# Patient Record
Sex: MALE | Race: WHITE | NOT HISPANIC OR LATINO | Employment: FULL TIME | URBAN - METROPOLITAN AREA
[De-identification: names, ages, dates, MRNs, and addresses within clinical notes are randomized per-mention and may not be internally consistent; named-entity substitution may affect disease eponyms.]

---

## 2024-11-20 ENCOUNTER — OFFICE VISIT (OUTPATIENT)
Dept: UROLOGY | Facility: CLINIC | Age: 64
End: 2024-11-20
Payer: COMMERCIAL

## 2024-11-20 VITALS
DIASTOLIC BLOOD PRESSURE: 81 MMHG | WEIGHT: 165.38 LBS | HEIGHT: 73 IN | HEART RATE: 102 BPM | BODY MASS INDEX: 21.92 KG/M2 | SYSTOLIC BLOOD PRESSURE: 122 MMHG

## 2024-11-20 DIAGNOSIS — N13.8 BPH WITH URINARY OBSTRUCTION: ICD-10-CM

## 2024-11-20 DIAGNOSIS — R35.1 NOCTURIA: ICD-10-CM

## 2024-11-20 DIAGNOSIS — N40.1 BPH WITH URINARY OBSTRUCTION: ICD-10-CM

## 2024-11-20 DIAGNOSIS — N20.0 KIDNEY STONE: Primary | ICD-10-CM

## 2024-11-20 DIAGNOSIS — R97.20 PSA ELEVATION: ICD-10-CM

## 2024-11-20 LAB
BILIRUB UR QL STRIP: POSITIVE
GLUCOSE UR QL STRIP: NEGATIVE
KETONES UR QL STRIP: POSITIVE
LEUKOCYTE ESTERASE UR QL STRIP: NEGATIVE
PH, POC UA: 6.5
POC BLOOD, URINE: NEGATIVE
POC NITRATES, URINE: NEGATIVE
POC RESIDUAL URINE VOLUME: 15 ML (ref 0–100)
PROT UR QL STRIP: POSITIVE
SP GR UR STRIP: 1.02 (ref 1–1.03)
UROBILINOGEN UR STRIP-ACNC: 0.2 (ref 0.3–2.2)

## 2024-11-20 PROCEDURE — 99999 PR PBB SHADOW E&M-NEW PATIENT-LVL IV: CPT | Mod: PBBFAC,,, | Performed by: UROLOGY

## 2024-11-20 RX ORDER — METFORMIN HYDROCHLORIDE 500 MG/1
TABLET, EXTENDED RELEASE ORAL
COMMUNITY
Start: 2024-08-20

## 2024-11-20 RX ORDER — KETOCONAZOLE 20 MG/ML
SHAMPOO, SUSPENSION TOPICAL
COMMUNITY
Start: 2024-01-24

## 2024-11-20 RX ORDER — PANTOPRAZOLE SODIUM 40 MG/1
40 TABLET, DELAYED RELEASE ORAL 2 TIMES DAILY
COMMUNITY

## 2024-11-20 RX ORDER — LIDOCAINE HYDROCHLORIDE 20 MG/ML
SOLUTION OROPHARYNGEAL
COMMUNITY
Start: 2024-06-19

## 2024-11-20 RX ORDER — GABAPENTIN 300 MG/1
600 CAPSULE ORAL 2 TIMES DAILY
COMMUNITY
Start: 2024-11-19

## 2024-11-20 RX ORDER — HYDROCODONE BITARTRATE AND ACETAMINOPHEN 10; 325 MG/1; MG/1
1 TABLET ORAL NIGHTLY PRN
COMMUNITY
Start: 2024-10-17

## 2024-11-20 RX ORDER — TAMSULOSIN HYDROCHLORIDE 0.4 MG/1
0.4 CAPSULE ORAL NIGHTLY
Qty: 90 CAPSULE | Refills: 1 | Status: SHIPPED | OUTPATIENT
Start: 2024-11-20 | End: 2025-11-20

## 2024-11-20 RX ORDER — ATORVASTATIN CALCIUM 20 MG/1
1 TABLET, FILM COATED ORAL DAILY
COMMUNITY
Start: 2024-09-03

## 2024-11-20 RX ORDER — CHLORHEXIDINE GLUCONATE ORAL RINSE 1.2 MG/ML
SOLUTION DENTAL
COMMUNITY
Start: 2024-06-19

## 2024-11-20 RX ORDER — CYCLOBENZAPRINE HCL 10 MG
10 TABLET ORAL NIGHTLY PRN
COMMUNITY
Start: 2024-09-12

## 2024-11-20 RX ORDER — LINACLOTIDE 145 UG/1
145 CAPSULE, GELATIN COATED ORAL
COMMUNITY

## 2024-11-20 RX ORDER — TIRZEPATIDE 2.5 MG/.5ML
2.5 INJECTION, SOLUTION SUBCUTANEOUS
COMMUNITY
Start: 2024-09-03

## 2024-11-20 RX ORDER — CITALOPRAM 20 MG/1
1 TABLET, FILM COATED ORAL DAILY
COMMUNITY
Start: 2024-09-03

## 2024-11-20 NOTE — PROGRESS NOTES
Subjective:       Patient ID: Peter Hdez is a 64 y.o. male.    Chief Complaint: Establish Care      History of Present Illness:     Mr Hdez has a history of kidneys stones.  He underwent a CT abdomen/pelvis without IV contrast on 9-17-24 that showed bilateral renal stones measuring up to 5 mm in the right kidney and 3 mm in the left kidney.  No ureteral stone or hydronephrosis.  Prostate gland is enlarged with central calcifications.     He has BPH with LUTS.  He had a few alfuzosin at home and he took it for 3 days with improvement in his flow.  Urinary flow has been slow.  Nocturia X 3.  No gross hematuria.  No flank pain.         Past Medical History:   Diagnosis Date    Diabetes mellitus     Kidney stone      Family History   Problem Relation Name Age of Onset    No Known Problems Father      Other (heart problems) Mother       Social History     Socioeconomic History    Marital status:    Tobacco Use    Smoking status: Never     Passive exposure: Never    Smokeless tobacco: Never   Substance and Sexual Activity    Alcohol use: Not Currently    Drug use: Never    Sexual activity: Not Currently     Social Drivers of Health     Financial Resource Strain: Low Risk  (11/2/2021)    Received from [facility] and Its Subsidiaries and Affiliates    Overall Financial Resource Strain (CARDIA)     Difficulty of Paying Living Expenses: Not very hard   Food Insecurity: No Food Insecurity (11/2/2021)    Received from [facility] and Its Subsidiaries and Affiliates    Hunger Vital Sign     Worried About Running Out of Food in the Last Year: Never true     Ran Out of Food in the Last Year: Never true   Transportation Needs: No Transportation Needs (11/2/2021)    Received from [facility] and Its Subsidiaries and Affiliates    PRAPARE - Transportation     Lack of Transportation (Medical): No      Lack of Transportation (Non-Medical): No   Physical Activity: Inactive (11/2/2021)    Received from [facility] and Its Subsidiaries and Affiliates    Exercise Vital Sign     Days of Exercise per Week: 0 days     Minutes of Exercise per Session: 0 min   Stress: Stress Concern Present (11/2/2021)    Received from [facility] and Its Subsidiaries and Affiliates    [employer] [place] [employer] - Occupational Stress Questionnaire     Feeling of Stress : Very much   Housing Stability: Low Risk  (11/2/2021)    Received from [facility] and Its Subsidiaries and Affiliates    Housing Stability Vital Sign     Unable to Pay for Housing in the Last Year: No     Number of Places Lived in the Last Year: 1     Unstable Housing in the Last Year: No     Outpatient Encounter Medications as of 11/20/2024   Medication Sig Dispense Refill    atorvastatin (LIPITOR) 20 MG tablet Take 1 tablet by mouth once daily.      chlorhexidine (PERIDEX) 0.12 % solution SMARTSIG:By Mouth      citalopram (CELEXA) 20 MG tablet Take 1 tablet by mouth once daily.      cyclobenzaprine (FLEXERIL) 10 MG tablet Take 10 mg by mouth nightly as needed.      gabapentin (NEURONTIN) 300 MG capsule Take 600 mg by mouth 2 (two) times daily.      HYDROcodone-acetaminophen (NORCO)  mg per tablet Take 1 tablet by mouth nightly as needed.      ketoconazole (NIZORAL) 2 % shampoo       LIDOcaine viscous HCl 2% (XYLOCAINE) 2 % Soln Take by mouth.      LINZESS 145 mcg Cap capsule Take 145 mcg by mouth.      metFORMIN (GLUCOPHAGE-XR) 500 MG ER 24hr tablet Take 1 tablet by mouth in the morning and 1 tablet in the evening with meals.      MOUNJARO 2.5 mg/0.5 mL PnIj Inject 2.5 mg into the skin.      pantoprazole (PROTONIX) 40 MG tablet Take 40 mg by mouth 2 (two) times daily.      tamsulosin (FLOMAX) 0.4 mg Cap Take 1 capsule (0.4 mg total) by  "mouth nightly. 90 capsule 1     No facility-administered encounter medications on file as of 11/20/2024.        Review of Systems   Constitutional:  Negative for chills and fever.   Respiratory:  Negative for shortness of breath.    Cardiovascular:  Negative for chest pain.   Gastrointestinal:  Negative for nausea and vomiting.   Genitourinary:  Negative for flank pain and hematuria.   Musculoskeletal:  Negative for back pain.   Skin:  Negative for rash.   Neurological:  Negative for dizziness.   Psychiatric/Behavioral:  Negative for agitation.        Objective:     /81   Pulse 102   Ht 6' 1" (1.854 m)   Wt 75 kg (165 lb 5.5 oz)   BMI 21.81 kg/m²     Physical Exam  Constitutional:       Appearance: Normal appearance.   Pulmonary:      Effort: Pulmonary effort is normal.   Abdominal:      Palpations: Abdomen is soft.   Genitourinary:     Comments: Prostate exam deferred  Neurological:      Mental Status: He is alert and oriented to person, place, and time.   Psychiatric:         Mood and Affect: Mood normal.         Office Visit on 11/20/2024   Component Date Value Ref Range Status    POC Blood, Urine 11/20/2024 Negative  Negative Final    POC Bilirubin, Urine 11/20/2024 Positive (A)  Negative Final    POC Urobilinogen, Urine 11/20/2024 0.2 (A)  0.3 - 2.2 Final    POC Ketones, Urine 11/20/2024 Positive (A)  Negative Final    POC Protein, Urine 11/20/2024 Positive (A)  Negative Final    POC Nitrates, Urine 11/20/2024 Negative  Negative Final    POC Glucose, Urine 11/20/2024 Negative  Negative Final    pH, UA 11/20/2024 6.5   Final    POC Specific Gravity, Urine 11/20/2024 1.025  1.003 - 1.029 Final    POC Leukocytes, Urine 11/20/2024 Negative  Negative Final    POC Residual Urine Volume 11/20/2024 15  0 - 100 mL Final        Results for orders placed or performed in visit on 11/20/24 (from the past 8760 hours)   POCT Bladder Scan   Result Value    POC Residual Urine Volume 15        Assessment:       1. " Kidney stone    2. BPH with urinary obstruction    3. Nocturia    4. PSA elevation        Plan:     Orders Placed This Encounter    US Retroperitoneal Complete    X-Ray KUB    POCT Urinalysis, Dipstick, Automated, W/O Scope    POCT Bladder Scan    tamsulosin (FLOMAX) 0.4 mg Cap        9-17-24  CT abdomen/pelvis without IV contrast.  OLOL.  See report.  Bilateral renal stones measuring up to 5 mm in the right kidney and 3 mm in the left kidney. No ureteral stone or hydronephrosis.  The urinary bladder is normal. Prostate gland is enlarged with central calcifications.  Mild multilevel degenerative disc disease and facet arthritis with possible left subarticular disc protrusion or herniation at L4-L5. Recommend follow-up MRI lumbar spine.     I reviewed the above imaging result.         8-23-24  PSA 2.8    1-30-23  PSA 2.2    11-11-21  PSA 2.5    9-21-20  PSA 2.8    9-17-24  Cr 0.87.  GFR 96.    8-23-24  HgbA1c 7.4    I reviewed the above lab results.         Assessment:  - CT abdomen/pelvis without IV contrast on 9-17-24 showed bilateral renal stones measuring up to 5 mm in the right kidney and 3 mm in the left kidney.  No ureteral stone or hydronephrosis.  Prostate gland is enlarged with central calcifications.   - BPH with LUTS.  He had a few alfuzosin at home and he took it for 3 days with improvement in his flow.  - Nocturia.  - PSA elevation.  - History of an ESWL in the past.    Plan:  - Observation of his bilateral asymptomatic renal stones at this time.  - He says he is planning to get his PSA rechecked through his PCP in a few weeks.  - Started tamsulosin 0.4 mg 1 PO qhs.  - Stop alfuzosin.  - I discussed dietary modifications with him today and I recommended he drink mostly water during the day.  - I recommended he limit his fluid intake at night to small amounts of water and to void just prior to bedtime.   - KUB and renal ultrasound in 6 months a few days prior to a 6 month appointment with a PVR on  arrival.

## 2025-05-19 ENCOUNTER — HOSPITAL ENCOUNTER (OUTPATIENT)
Dept: RADIOLOGY | Facility: HOSPITAL | Age: 65
Discharge: HOME | End: 2025-05-19
Attending: UROLOGY
Payer: COMMERCIAL

## 2025-05-19 DIAGNOSIS — N20.0 KIDNEY STONE: ICD-10-CM

## 2025-05-19 PROCEDURE — 76770 US EXAM ABDO BACK WALL COMP: CPT | Mod: TC

## 2025-05-19 PROCEDURE — 74018 RADEX ABDOMEN 1 VIEW: CPT | Mod: 26,,, | Performed by: RADIOLOGY

## 2025-05-19 PROCEDURE — 76770 US EXAM ABDO BACK WALL COMP: CPT | Mod: 26,,, | Performed by: STUDENT IN AN ORGANIZED HEALTH CARE EDUCATION/TRAINING PROGRAM

## 2025-05-19 PROCEDURE — 74018 RADEX ABDOMEN 1 VIEW: CPT | Mod: TC

## 2025-05-22 ENCOUNTER — OFFICE VISIT (OUTPATIENT)
Dept: UROLOGY | Facility: CLINIC | Age: 65
End: 2025-05-22
Payer: COMMERCIAL

## 2025-05-22 VITALS — HEIGHT: 73 IN | WEIGHT: 165.38 LBS | BODY MASS INDEX: 21.92 KG/M2

## 2025-05-22 DIAGNOSIS — D41.01 NEOPLASM OF UNCERTAIN BEHAVIOR OF RIGHT KIDNEY: Primary | ICD-10-CM

## 2025-05-22 DIAGNOSIS — R97.20 PSA ELEVATION: ICD-10-CM

## 2025-05-22 DIAGNOSIS — E11.69 ERECTILE DYSFUNCTION ASSOCIATED WITH TYPE 2 DIABETES MELLITUS: ICD-10-CM

## 2025-05-22 DIAGNOSIS — N40.0 BPH WITHOUT URINARY OBSTRUCTION: ICD-10-CM

## 2025-05-22 DIAGNOSIS — N52.1 ERECTILE DYSFUNCTION ASSOCIATED WITH TYPE 2 DIABETES MELLITUS: ICD-10-CM

## 2025-05-22 DIAGNOSIS — N20.0 KIDNEY STONES: ICD-10-CM

## 2025-05-22 LAB
BILIRUBIN, UA POC OHS: NEGATIVE
BLOOD, UA POC OHS: NEGATIVE
CLARITY, UA POC OHS: CLEAR
COLOR, UA POC OHS: YELLOW
GLUCOSE, UA POC OHS: NEGATIVE
KETONES, UA POC OHS: NEGATIVE
LEUKOCYTES, UA POC OHS: NEGATIVE
NITRITE, UA POC OHS: NEGATIVE
PH, UA POC OHS: 6.5
POC RESIDUAL URINE VOLUME: 4 ML (ref 0–100)
PROTEIN, UA POC OHS: ABNORMAL
SPECIFIC GRAVITY, UA POC OHS: 1.02
UROBILINOGEN, UA POC OHS: 0.2

## 2025-05-22 PROCEDURE — 1159F MED LIST DOCD IN RCRD: CPT | Mod: CPTII,S$GLB,, | Performed by: UROLOGY

## 2025-05-22 PROCEDURE — 99215 OFFICE O/P EST HI 40 MIN: CPT | Mod: S$GLB,,, | Performed by: UROLOGY

## 2025-05-22 PROCEDURE — 3288F FALL RISK ASSESSMENT DOCD: CPT | Mod: CPTII,S$GLB,, | Performed by: UROLOGY

## 2025-05-22 PROCEDURE — 99999 PR PBB SHADOW E&M-EST. PATIENT-LVL IV: CPT | Mod: PBBFAC,,, | Performed by: UROLOGY

## 2025-05-22 PROCEDURE — 81003 URINALYSIS AUTO W/O SCOPE: CPT | Mod: QW,S$GLB,, | Performed by: UROLOGY

## 2025-05-22 PROCEDURE — 51798 US URINE CAPACITY MEASURE: CPT | Mod: S$GLB,,, | Performed by: UROLOGY

## 2025-05-22 PROCEDURE — 3008F BODY MASS INDEX DOCD: CPT | Mod: CPTII,S$GLB,, | Performed by: UROLOGY

## 2025-05-22 PROCEDURE — 1101F PT FALLS ASSESS-DOCD LE1/YR: CPT | Mod: CPTII,S$GLB,, | Performed by: UROLOGY

## 2025-05-22 RX ORDER — TADALAFIL 10 MG/1
10 TABLET ORAL SEE ADMIN INSTRUCTIONS
Qty: 30 TABLET | Refills: 6 | Status: SHIPPED | OUTPATIENT
Start: 2025-05-22 | End: 2026-05-22

## 2025-05-22 NOTE — PROGRESS NOTES
"Subjective:       Patient ID: Peter Hdez is a 65 y.o. male.    Chief Complaint: Follow-up (6 month)      History of Present Illness:     Mr Hdez underwent a renal ultrasound on 5-19-25 that showed:  Right kidney: Hypoechoic nodular mass situated at the inferior pole of the right kidney measuring 2.5 x 2.1 x 2.0 cm.  No renal stone. No hydronephrosis.  Left kidney: No mass. Hyperechoic focus in the left inferior pole measuring 6 mm.  No hydronephrosis.    He underwent a CT abdomen/pelvis without IV contrast on 9-17-24 that showed bilateral renal stones measuring up to 5 mm in the right kidney and 3 mm in the left kidney.  No ureteral stone or hydronephrosis.  Prostate gland is enlarged with central calcifications.     He has a history of a 5 mm right proximal ureteral stone s/p right ESWL by me on 5-19-22.    He has BPH without significant LUTS.  He is currently not taking a prostate medication.  He has taken tamsulosin, alfuzosin, and finasteride at different times in the past.  He has a rising PSA level.  He is having problems with ED.         Past Medical History:   Diagnosis Date    Diabetes mellitus     Kidney stone      Family History   Problem Relation Name Age of Onset    No Known Problems Father      Other (heart problems) Mother       Social History[1]  Encounter Medications[2]     Review of Systems   Constitutional:  Negative for chills and fever.   Respiratory:  Negative for shortness of breath.    Cardiovascular:  Negative for chest pain.   Gastrointestinal:  Negative for nausea and vomiting.   Genitourinary:  Negative for difficulty urinating, flank pain and hematuria.   Musculoskeletal:  Negative for back pain.   Skin:  Negative for rash.   Neurological:  Negative for dizziness.   Psychiatric/Behavioral:  Negative for agitation.        Objective:     BP (P) 101/69   Pulse (P) 85   Ht 6' 1" (1.854 m)   Wt 75 kg (165 lb 5.5 oz)   BMI 21.81 kg/m²     Physical Exam  Constitutional:       " Appearance: Normal appearance.   Pulmonary:      Effort: Pulmonary effort is normal.   Abdominal:      Palpations: Abdomen is soft.   Genitourinary:     Comments: Prostate exam deferred  Neurological:      Mental Status: He is alert and oriented to person, place, and time.   Psychiatric:         Mood and Affect: Mood normal.         Office Visit on 05/22/2025   Component Date Value Ref Range Status    POC Residual Urine Volume 05/22/2025 4  0 - 100 mL Final    Color, POC UA 05/22/2025 Yellow  Yellow, Straw, Colorless Final    Clarity, POC UA 05/22/2025 Clear  Clear Final    Glucose, POC UA 05/22/2025 Negative  Negative Final    Bilirubin, POC UA 05/22/2025 Negative  Negative Final    Ketones, POC UA 05/22/2025 Negative  Negative Final    Spec Grav POC UA 05/22/2025 1.025  1.005 - 1.030 Final    Blood, POC UA 05/22/2025 Negative  Negative Final    pH, POC UA 05/22/2025 6.5  5.0 - 8.0 Final    Protein, POC UA 05/22/2025 Trace (A)  Negative Final    Urobilinogen, POC UA 05/22/2025 0.2  <=1.0 Final    Nitrite, POC UA 05/22/2025 Negative  Negative Final    WBC, POC UA 05/22/2025 Negative  Negative Final        Results for orders placed or performed in visit on 05/22/25 (from the past 8760 hours)   POCT Bladder Scan   Result Value    POC Residual Urine Volume 4        Assessment:       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 Kidney stones    3. PSA elevation    4. BPH without urinary obstruction    5. Erectile dysfunction associated with type 2 diabetes mellitus        Plan:     Orders Placed This Encounter    CT Urogram Abd Pelvis W WO    POCT Bladder Scan    POCT Urinalysis(Instrument)    tadalafiL (CIALIS) 10 MG tablet        9-17-24  CT abdomen/pelvis without IV contrast.  OLOL.  See report.  Bilateral renal stones measuring up to 5 mm in the right kidney and 3 mm in the left kidney. No ureteral stone or hydronephrosis.  The urinary bladder is normal. Prostate gland is enlarged with central  calcifications.  Mild multilevel degenerative disc disease and facet arthritis with possible left subarticular disc protrusion or herniation at L4-L5. Recommend follow-up MRI lumbar spine.     5-19-25  Renal ultrasound.  See report.  Right kidney: Hypoechoic nodular mass situated at the inferior pole of the right kidney measuring 2.5 x 2.1 x 2.0 cm.  No renal stone. No hydronephrosis.  Left kidney: No mass. Hyperechoic focus in the left inferior pole measuring 6 mm.  No hydronephrosis.    5-19-25  KUB.  See report.  Multiple punctate calcifications thought to project over either kidney likely representing nonobstructing calculi.  Calcified phleboliths seen projecting over the pelvis.      I reviewed the above imaging result.           11-25-24  PSA 3.2     8-23-24  PSA 2.8     1-30-23  PSA 2.2    5-26-22  PSA 1.9     11-11-21  PSA 2.5     9-21-20  PSA 2.8     9-17-24  Cr 0.87.  GFR 96.     8-23-24  HgbA1c 7.4     I reviewed the above lab results.            Assessment:  - Renal ultrasound on 5-19-25 showed:  Right kidney: Hypoechoic nodular mass situated at the inferior pole of the right kidney measuring 2.5 x 2.1 x 2.0 cm.  No renal stone. No hydronephrosis.  Left kidney: No mass. Hyperechoic focus in the left inferior pole measuring 6 mm.  No hydronephrosis.  - CT abdomen/pelvis without IV contrast on 9-17-24 showed bilateral renal stones measuring up to 5 mm in the right kidney and 3 mm in the left kidney.  No ureteral stone or hydronephrosis.  Prostate gland is enlarged with central calcifications.   - History of a 5 mm right proximal ureteral stone s/p right ESWL by me on 5-19-22.  - BPH without significant LUTS.  He is currently not taking a prostate medication.  He has taken tamsulosin, alfuzosin, and finasteride at different times in the past.  - PSA elevation.  - ED.  - DM.  He says that he has lost a lot of weight on mounjaro.     Plan:  - I had a long discussion with the patient today regarding his recent  renal ultrasound result with the patient today.  I explained that his renal ultrasound is showing a right lower pole renal mass.  I answered all of his good questions to his apparent understanding and satisfaction.  The mutual decision was made to proceed with a CT scan for further evaluation of his right lower pole renal mass.  - He says he is planning to get lab work done soon through his PCP and that he will ask his PCP's office to get his PSA checked.  - I discussed options with the patient today regarding his ED and he would like to try tadalafil as needed.  - I prescribed him tadalafil 10 mg, disp #30, 6 refills to [facility]'s pharmacy today on 5-22-25 with detailed verbal instructions.  I explained that tadalafil 20 mg is the highest dose and to take the lowest effective dose.  - I discussed dietary modifications with him today and I recommended he drink mostly water during the day.  - CT urogram followed by an office visit.                              [1]   Social History  Socioeconomic History    Marital status:    Tobacco Use    Smoking status: Never     Passive exposure: Never    Smokeless tobacco: Never   Substance and Sexual Activity    Alcohol use: Not Currently    Drug use: Never    Sexual activity: Not Currently     Social Drivers of Health     Financial Resource Strain: Low Risk  (11/2/2021)    Received from [facility] and Its Subsidiaries and Affiliates    Overall Financial Resource Strain (CARDIA)     Difficulty of Paying Living Expenses: Not very hard   Food Insecurity: No Food Insecurity (11/2/2021)    Received from [facility] and Its Subsidiaries and Affiliates    Hunger Vital Sign     Worried About Running Out of Food in the Last Year: Never true     Ran Out of Food in the Last Year: Never true   Transportation Needs: No Transportation Needs (11/2/2021)    Received from [facility] and Its Subsidiaries and Affiliates    PRAPARE - Transportation     Lack of Transportation (Medical): No     Lack of Transportation (Non-Medical): No   Physical Activity: Inactive (11/2/2021)    [facility]    Exercise Vital Sign     Days of Exercise per Week: 0 days     Minutes of Exercise per Session: 0 min   Stress: Stress Concern Present (11/2/2021)    Received from [facility] Its Subsidiaries and Affiliates    [employer] [place] of [employer]ational Health - Occupational Stress Questionnaire     Feeling of Stress : Very much   Housing Stability: Low Risk  (11/2/2021)    Received from [facility] and Its Subsidiaries and Affiliates    Housing Stability Vital Sign     Unable to Pay for Housing in the Last Year: No     Number of Places Lived in the Last Year: 1     Unstable Housing in the Last Year: No   [2]   Outpatient Encounter Medications as of 5/22/2025   Medication Sig Dispense Refill    atorvastatin (LIPITOR) 20 MG tablet Take 1 tablet by mouth once daily.      chlorhexidine (PERIDEX) 0.12 % solution SMARTSIG:By Mouth      citalopram (CELEXA) 20 MG tablet Take 1 tablet by mouth once daily.      cyclobenzaprine (FLEXERIL) 10 MG tablet Take 10 mg by mouth nightly as needed.      gabapentin (NEURONTIN) 300 MG capsule Take 600 mg by mouth 2 (two) times daily.      HYDROcodone-acetaminophen (NORCO)  mg per tablet Take 1 tablet by mouth nightly as needed.      ketoconazole (NIZORAL) 2 % shampoo       LIDOcaine viscous HCl 2% (XYLOCAINE) 2 % Soln Take by mouth.      LINZESS 145 mcg Cap capsule Take 145 mcg by mouth.      metFORMIN (GLUCOPHAGE-XR) 500 MG ER 24hr tablet Take 1 tablet by mouth in the morning and 1 tablet in the evening with meals.      MOUNJARO 2.5 mg/0.5 mL PnIj Inject 2.5 mg into the skin.      pantoprazole (PROTONIX) 40 MG  tablet Take 40 mg by mouth 2 (two) times daily.      tadalafiL (CIALIS) 10 MG tablet Take 1 tablet (10 mg total) by mouth As instructed for Erectile Dysfunction (Take either 1 tablet or 2 tablets by mouth as needed 2 to 12 hours prior to sexual activity. Take the lowest effective dose.). 30 tablet 6    tamsulosin (FLOMAX) 0.4 mg Cap Take 1 capsule (0.4 mg total) by mouth nightly. 90 capsule 1     No facility-administered encounter medications on file as of 5/22/2025.

## 2025-05-29 DIAGNOSIS — D41.01 NEOPLASM OF UNCERTAIN BEHAVIOR OF RIGHT KIDNEY: Primary | ICD-10-CM

## 2025-05-30 ENCOUNTER — HOSPITAL ENCOUNTER (OUTPATIENT)
Dept: RADIOLOGY | Facility: HOSPITAL | Age: 65
Discharge: HOME | End: 2025-05-30
Attending: UROLOGY
Payer: COMMERCIAL

## 2025-05-30 ENCOUNTER — RESULTS FOLLOW-UP (OUTPATIENT)
Dept: UROLOGY | Facility: HOSPITAL | Age: 65
End: 2025-05-30

## 2025-05-30 ENCOUNTER — PATIENT MESSAGE (OUTPATIENT)
Dept: UROLOGY | Facility: CLINIC | Age: 65
End: 2025-05-30
Payer: COMMERCIAL

## 2025-05-30 ENCOUNTER — TELEPHONE (OUTPATIENT)
Dept: UROLOGY | Facility: CLINIC | Age: 65
End: 2025-05-30
Payer: COMMERCIAL

## 2025-05-30 DIAGNOSIS — D41.01 NEOPLASM OF UNCERTAIN BEHAVIOR OF RIGHT KIDNEY: ICD-10-CM

## 2025-05-30 DIAGNOSIS — N20.0 KIDNEY STONES: ICD-10-CM

## 2025-05-30 PROCEDURE — 74178 CT ABD&PLV WO CNTR FLWD CNTR: CPT | Mod: 26,,, | Performed by: RADIOLOGY

## 2025-05-30 PROCEDURE — 74178 CT ABD&PLV WO CNTR FLWD CNTR: CPT | Mod: TC

## 2025-05-30 PROCEDURE — 25500020 PHARM REV CODE 255: Performed by: UROLOGY

## 2025-05-30 RX ADMIN — IOHEXOL 100 ML: 350 INJECTION, SOLUTION INTRAVENOUS at 08:05

## 2025-05-30 NOTE — TELEPHONE ENCOUNTER
Attempted to contact pt but no answer.  Message from Dr. Rivas below left on his vm.  Maya Doe LPN  ----- Message from Stuart Rivas MD sent at 5/30/2025 12:55 PM CDT -----  Please call Mr Hdez and let him know that I reviewed his CT scan images and the report and that no mass is seen on either kidney which is great.  He has several small kidney stones in both   kidneys the the largest being in the right kidney and measuring 5 mm.  His prostate is enlarged and it is bulging into the base of his urinary bladder.  Make sure he knows the details of his follow   up appointment with me for further discuss of his CT scan findings.  Make sure that he knows that his appointment is at my [place] office.  Ask him if he has any questions.  ----- Message -----  From: Hu, Rad Results In  Sent: 5/30/2025  10:50 AM CDT  To: Stuart Rivas MD

## 2025-06-09 ENCOUNTER — OFFICE VISIT (OUTPATIENT)
Facility: CLINIC | Age: 65
End: 2025-06-09
Payer: COMMERCIAL

## 2025-06-09 VITALS
BODY MASS INDEX: 21.92 KG/M2 | HEART RATE: 93 BPM | SYSTOLIC BLOOD PRESSURE: 98 MMHG | WEIGHT: 165.38 LBS | HEIGHT: 73 IN | DIASTOLIC BLOOD PRESSURE: 61 MMHG

## 2025-06-09 DIAGNOSIS — N40.1 BENIGN LOCALIZED PROSTATIC HYPERPLASIA WITH LOWER URINARY TRACT SYMPTOMS (LUTS): ICD-10-CM

## 2025-06-09 DIAGNOSIS — R97.20 PSA ELEVATION: ICD-10-CM

## 2025-06-09 DIAGNOSIS — N20.0 KIDNEY STONES: Primary | ICD-10-CM

## 2025-06-09 LAB
BILIRUB SERPL-MCNC: NORMAL MG/DL
BLOOD URINE, POC: NORMAL
COLOR, POC UA: YELLOW
GLUCOSE UR QL STRIP: NORMAL
KETONES UR QL STRIP: NORMAL
LEUKOCYTE ESTERASE URINE, POC: NORMAL
NITRITE, POC UA: NORMAL
PH, POC UA: 6.5
PROTEIN, POC: NORMAL
SPECIFIC GRAVITY, POC UA: 1.02
UROBILINOGEN, POC UA: 0.2

## 2025-06-09 PROCEDURE — 1101F PT FALLS ASSESS-DOCD LE1/YR: CPT | Mod: CPTII,S$GLB,, | Performed by: UROLOGY

## 2025-06-09 PROCEDURE — 81001 URINALYSIS AUTO W/SCOPE: CPT | Mod: S$GLB,,, | Performed by: UROLOGY

## 2025-06-09 PROCEDURE — 3078F DIAST BP <80 MM HG: CPT | Mod: CPTII,S$GLB,, | Performed by: UROLOGY

## 2025-06-09 PROCEDURE — 3074F SYST BP LT 130 MM HG: CPT | Mod: CPTII,S$GLB,, | Performed by: UROLOGY

## 2025-06-09 PROCEDURE — 1159F MED LIST DOCD IN RCRD: CPT | Mod: CPTII,S$GLB,, | Performed by: UROLOGY

## 2025-06-09 PROCEDURE — 99999 PR PBB SHADOW E&M-EST. PATIENT-LVL III: CPT | Mod: PBBFAC,,, | Performed by: UROLOGY

## 2025-06-09 PROCEDURE — 3008F BODY MASS INDEX DOCD: CPT | Mod: CPTII,S$GLB,, | Performed by: UROLOGY

## 2025-06-09 PROCEDURE — 3044F HG A1C LEVEL LT 7.0%: CPT | Mod: CPTII,S$GLB,, | Performed by: UROLOGY

## 2025-06-09 PROCEDURE — 99214 OFFICE O/P EST MOD 30 MIN: CPT | Mod: S$GLB,,, | Performed by: UROLOGY

## 2025-06-09 PROCEDURE — 1125F AMNT PAIN NOTED PAIN PRSNT: CPT | Mod: CPTII,S$GLB,, | Performed by: UROLOGY

## 2025-06-09 PROCEDURE — 3288F FALL RISK ASSESSMENT DOCD: CPT | Mod: CPTII,S$GLB,, | Performed by: UROLOGY

## 2025-06-09 RX ORDER — TAMSULOSIN HYDROCHLORIDE 0.4 MG/1
0.4 CAPSULE ORAL NIGHTLY
Qty: 90 CAPSULE | Refills: 1 | Status: SHIPPED | OUTPATIENT
Start: 2025-06-09 | End: 2026-06-09

## 2025-06-09 RX ORDER — ALPRAZOLAM 0.25 MG/1
0.25 TABLET ORAL DAILY PRN
COMMUNITY

## 2025-06-09 NOTE — PROGRESS NOTES
Subjective:       Patient ID: Peter Hdez is a 65 y.o. male.    Chief Complaint: Follow-up (Imaging F/u)      History of Present Illness:     Mr Hdez underwent a renal ultrasound on 5-19-25 that showed:  Right kidney: Hypoechoic nodular mass situated at the inferior pole of the right kidney measuring 2.5 x 2.1 x 2.0 cm.  No renal stone. No hydronephrosis.  Left kidney: No mass. Hyperechoic focus in the left inferior pole measuring 6 mm.  No hydronephrosis.    He underwent a CT urogram on 5-30-25 that showed multiple nonobstructing bilateral renal calculi measuring up to 5 mm on the right and 2 mm on the left. No hydronephrosis on either side. No ureteral calculus. No renal mass. No urothelial lesion demonstrated in the bilateral renal pelves or opacified portions of the bilateral ureters.  Bladder: No evidence of wall thickening.  Prostate is enlarged with a median lobe within the base of the bladder.    He has trilobar hypertrophy BPH with LUTS.  He has a slow urinary stream.  He feels that he is emptying his bladder.  Nocturia X 1.  He has taken tamsulosin, alfuzosin, and finasteride at different times in the past.  He had a PSA elevation that decreased with observation.  He has a history of a 5 mm right proximal ureteral stone s/p right ESWL by me on 5-19-22.  No flank pain.         Past Medical History:   Diagnosis Date    Diabetes mellitus     Kidney stone      Family History   Problem Relation Name Age of Onset    No Known Problems Father      Other (heart problems) Mother       Social History[1]  Encounter Medications[2]     Review of Systems   Constitutional:  Negative for chills and fever.   Respiratory:  Negative for shortness of breath.    Cardiovascular:  Negative for chest pain.   Gastrointestinal:  Negative for nausea and vomiting.   Genitourinary:  Negative for flank pain and hematuria.   Musculoskeletal:  Negative for back pain.   Skin:  Negative for rash.   Neurological:  Negative for  "dizziness.   Psychiatric/Behavioral:  Negative for agitation.        Objective:     BP 98/61   Pulse 93   Ht 6' 1" (1.854 m)   Wt 75 kg (165 lb 5.5 oz)   BMI 21.81 kg/m²     Physical Exam  Constitutional:       Appearance: Normal appearance.   Pulmonary:      Effort: Pulmonary effort is normal.   Abdominal:      Palpations: Abdomen is soft.   Neurological:      Mental Status: He is alert and oriented to person, place, and time.   Psychiatric:         Mood and Affect: Mood normal.         Office Visit on 06/09/2025   Component Date Value Ref Range Status    Color, UA 06/09/2025 Yellow   Final    Spec Grav UA 06/09/2025 1.025   Final    pH, UA 06/09/2025 6.5   Final    WBC, UA 06/09/2025 n   Final    Nitrite, UA 06/09/2025 n   Final    Protein, POC 06/09/2025 1+   Final    Glucose, UA 06/09/2025 n   Final    Ketones, UA 06/09/2025 trace   Final    Urobilinogen, UA 06/09/2025 0.2   Final    Bilirubin, POC 06/09/2025 n   Final    Blood, UA 06/09/2025 n   Final        Results for orders placed or performed in visit on 05/22/25 (from the past 8760 hours)   POCT Bladder Scan   Result Value    POC Residual Urine Volume 4        Assessment:       1. Kidney stones    2. Benign localized prostatic hyperplasia with lower urinary tract symptoms (LUTS)    3. PSA elevation        Plan:     Orders Placed This Encounter    PSA, Total and Free    POCT urinalysis, dipstick or tablet reag    tamsulosin (FLOMAX) 0.4 mg Cap        5-30-25  CT urogram.  See report.  Multiple nonobstructing bilateral renal calculi measuring up to 5 mm on the right and 2 mm on the left. No hydronephrosis on either side. No ureteral calculus. No renal mass. No urothelial lesion demonstrated in the bilateral renal pelves or opacified portions of the bilateral ureters.  Bladder: No evidence of wall thickening.  Prostate is enlarged with a median lobe within the base of the bladder.    9-17-24  CT abdomen/pelvis without IV contrast.  OLOL.  See report.  " Bilateral renal stones measuring up to 5 mm in the right kidney and 3 mm in the left kidney. No ureteral stone or hydronephrosis.  The urinary bladder is normal. Prostate gland is enlarged with central calcifications.  Mild multilevel degenerative disc disease and facet arthritis with possible left subarticular disc protrusion or herniation at L4-L5. Recommend follow-up MRI lumbar spine.      5-19-25  Renal ultrasound.  See report.  Right kidney: Hypoechoic nodular mass situated at the inferior pole of the right kidney measuring 2.5 x 2.1 x 2.0 cm.  No renal stone. No hydronephrosis.  Left kidney: No mass. Hyperechoic focus in the left inferior pole measuring 6 mm.  No hydronephrosis.     5-19-25  KUB.  See report.  Multiple punctate calcifications thought to project over either kidney likely representing nonobstructing calculi.  Calcified phleboliths seen projecting over the pelvis.      I reviewed the above imaging result.           5-23-25  PSA 2.9     11-25-24  PSA 3.2     8-23-24  PSA 2.8     1-30-23  PSA 2.2     5-26-22  PSA 1.9     11-11-21  PSA 2.5     9-21-20  PSA 2.8    5-30-25  Cr 0.8.  GFR >60.     9-17-24  Cr 0.87.  GFR 96.    5-23-25  HgbA1c 5.7     8-23-24  HgbA1c 7.4     I reviewed the above lab results.            Assessment:  - CT urogram on 5-30-25 showed multiple nonobstructing bilateral renal calculi measuring up to 5 mm on the right and 2 mm on the left. No hydronephrosis on either side. No ureteral calculus. No renal mass. No urothelial lesion demonstrated in the bilateral renal pelves or opacified portions of the bilateral ureters.  Bladder: No evidence of wall thickening.  Prostate is enlarged with a median lobe within the base of the bladder.  - Renal ultrasound on 5-19-25 showed:  Right kidney: Hypoechoic nodular mass situated at the inferior pole of the right kidney measuring 2.5 x 2.1 x 2.0 cm.  No renal stone. No hydronephrosis.  Left kidney: No mass. Hyperechoic focus in the left  inferior pole measuring 6 mm.  No hydronephrosis.  - History of a 5 mm right proximal ureteral stone s/p right ESWL by me on 5-19-22.  - Trilobar hypertrophy BPH with LUTS.  Slow urinary stream.  He has taken tamsulosin, alfuzosin, and finasteride at different times in the past.  - PSA elevation that decreased with observation.  - ED.  - DM.  He said that he has lost a lot of weight on mounjaro.     Plan:  - I showed the patient his CT urogram images today.  I discussed options with him regarding his multiple right greater than left non obstructing renal stones.  The patient would like to proceed with observation at this time.  He would like to hold off on surveillance imaging at this time.  - I discussed options with the patient today regarding his enlarged prostate and urinary symptoms and the mutual decision was made for him to take tamsulosin.  - I prescribed him tamsulosin 0.4 mg 1 PO qhs today on 6-9-25.  - The mutual decision was made to recheck his PSA in 6 months.  - I prescribed him tadalafil 10 mg, disp #30, 6 refills to [facility] pharmacy on 5-22-25 with detailed verbal instructions.  I explained that tadalafil 20 mg is the highest dose and to take the lowest effective dose.  - I discussed dietary modifications with him today and I recommended he drink mostly water during the day.  - PSA in 6 months a few days prior to a 6 month appointment.  - RTC in 6 months with a PVR on arrival or sooner as needed.                            [1]   Social History  Socioeconomic History    Marital status:    Tobacco Use    Smoking status: Never     Passive exposure: Never    Smokeless tobacco: Never   Substance and Sexual Activity    Alcohol use: Not Currently    Drug use: Never    Sexual activity: Not Currently     Social Drivers of Health     Financial Resource Strain: Medium Risk (5/30/2025)    Overall Financial Resource Strain (CARDIA)     Difficulty of Paying Living Expenses: Somewhat hard   Food Insecurity:  No Food Insecurity (5/30/2025)    Hunger Vital Sign     Worried About Running Out of Food in the Last Year: Never true     Ran Out of Food in the Last Year: Never true   Transportation Needs: No Transportation Needs (5/30/2025)    PRAPARE - Transportation     Lack of Transportation (Medical): No     Lack of Transportation (Non-Medical): No   Physical Activity: Insufficiently Active (5/30/2025)    Exercise Vital Sign     Days of Exercise per Week: 1 day     Minutes of Exercise per Session: 30 min   Stress: Stress Concern Present (5/30/2025)    [facility] [employer] - Occupational Stress Questionnaire     Feeling of Stress : Rather much   Housing Stability: High Risk (5/30/2025)    Housing Stability Vital Sign     Unable to Pay for Housing in the Last Year: Yes     Homeless in the Last Year: No   [2]   Outpatient Encounter Medications as of 6/9/2025   Medication Sig Dispense Refill    ALPRAZolam (XANAX) 0.25 MG tablet Take 0.25 mg by mouth daily as needed for Anxiety.      atorvastatin (LIPITOR) 20 MG tablet Take 1 tablet by mouth once daily.      chlorhexidine (PERIDEX) 0.12 % solution SMARTSIG:By Mouth      citalopram (CELEXA) 20 MG tablet Take 40 mg by mouth once daily.      cyclobenzaprine (FLEXERIL) 10 MG tablet Take 10 mg by mouth nightly as needed.      gabapentin (NEURONTIN) 300 MG capsule Take 600 mg by mouth 2 (two) times daily.      HYDROcodone-acetaminophen (NORCO)  mg per tablet Take 1 tablet by mouth nightly as needed.      ketoconazole (NIZORAL) 2 % shampoo       LIDOcaine viscous HCl 2% (XYLOCAINE) 2 % Soln Take by mouth.      LINZESS 145 mcg Cap capsule Take 145 mcg by mouth.      metFORMIN (GLUCOPHAGE-XR) 500 MG ER 24hr tablet Take 1 tablet by mouth in the morning and 1 tablet in the evening with meals.      MOUNJARO 2.5 mg/0.5 mL PnIj Inject 2.5 mg into the skin.      pantoprazole (PROTONIX) 40 MG tablet Take 40 mg by mouth 2 (two) times daily.      tadalafiL (CIALIS) 10 MG  tablet Take 1 tablet (10 mg total) by mouth As instructed for Erectile Dysfunction (Take either 1 tablet or 2 tablets by mouth as needed 2 to 12 hours prior to sexual activity. Take the lowest effective dose.). 30 tablet 6    tamsulosin (FLOMAX) 0.4 mg Cap Take 1 capsule (0.4 mg total) by mouth nightly. 90 capsule 1    tamsulosin (FLOMAX) 0.4 mg Cap Take 1 capsule (0.4 mg total) by mouth nightly. 90 capsule 1    [] iohexoL (OMNIPAQUE 350) injection 100 mL        No facility-administered encounter medications on file as of 2025.